# Patient Record
Sex: FEMALE | Race: OTHER | HISPANIC OR LATINO | URBAN - METROPOLITAN AREA
[De-identification: names, ages, dates, MRNs, and addresses within clinical notes are randomized per-mention and may not be internally consistent; named-entity substitution may affect disease eponyms.]

---

## 2022-11-04 ENCOUNTER — LAB OUTSIDE AN ENCOUNTER (OUTPATIENT)
Dept: URBAN - METROPOLITAN AREA CLINIC 88 | Facility: CLINIC | Age: 72
End: 2022-11-04

## 2022-11-04 ENCOUNTER — OFFICE VISIT (OUTPATIENT)
Dept: URBAN - METROPOLITAN AREA CLINIC 88 | Facility: CLINIC | Age: 72
End: 2022-11-04
Payer: MEDICARE

## 2022-11-04 VITALS
WEIGHT: 143.8 LBS | BODY MASS INDEX: 26.46 KG/M2 | HEART RATE: 96 BPM | TEMPERATURE: 97.5 F | SYSTOLIC BLOOD PRESSURE: 139 MMHG | HEIGHT: 62 IN | DIASTOLIC BLOOD PRESSURE: 83 MMHG

## 2022-11-04 DIAGNOSIS — R07.89 OTHER CHEST PAIN: ICD-10-CM

## 2022-11-04 DIAGNOSIS — K21.00 GASTROESOPHAGEAL REFLUX DISEASE WITH ESOPHAGITIS WITHOUT HEMORRHAGE: ICD-10-CM

## 2022-11-04 DIAGNOSIS — K44.9 HIATAL HERNIA: ICD-10-CM

## 2022-11-04 PROCEDURE — 99204 OFFICE O/P NEW MOD 45 MIN: CPT | Performed by: NURSE PRACTITIONER

## 2022-11-04 RX ORDER — ESTRADIOL 10 UG/1
INSERT VAGINAL
Qty: 0 | Refills: 0 | Status: DISCONTINUED | COMMUNITY
Start: 1900-01-01

## 2022-11-04 RX ORDER — ALPRAZOLAM 0.25 MG/1
1 TABLET TABLET ORAL AS NEEDED
Refills: 0 | Status: ACTIVE | COMMUNITY
Start: 1900-01-01

## 2022-11-04 RX ORDER — OMEPRAZOLE 20 MG/1
TAKE 1 CAPSULE (20 MG) BY ORAL ROUTE ONCE DAILY 30 MINUTES TO 1 HOUR BEFORE A MEAL FOR 60 DAYS CAPSULE, DELAYED RELEASE ORAL 1
Qty: 60 | Refills: 0 | Status: DISCONTINUED | COMMUNITY
Start: 2017-10-20

## 2022-11-04 RX ORDER — PANTOPRAZOLE SODIUM 20 MG/1
TAKE 1 TABLET TABLET, DELAYED RELEASE ORAL
Qty: 30 | Refills: 1 | OUTPATIENT
Start: 2022-11-04

## 2022-11-04 RX ORDER — ACETYLCARNITINE 500 MG
CAPSULE ORAL
Qty: 0 | Refills: 0 | Status: DISCONTINUED | COMMUNITY
Start: 1900-01-01

## 2022-11-04 RX ORDER — TURMERIC 400 MG
CAPSULE ORAL
Qty: 0 | Refills: 0 | Status: ACTIVE | COMMUNITY
Start: 1900-01-01

## 2022-11-04 RX ORDER — DICLOFENAC SODIUM 50 MG/1
1 TABLET AS NEEDED TABLET, DELAYED RELEASE ORAL PRN
Status: ACTIVE | COMMUNITY

## 2022-11-04 NOTE — HPI-TODAY'S VISIT:
Presents today for evaluation of GERD.  Voices a long history of GERD.  Over the last few years, symptoms were intermittently occuring.  Over the last 3 months, voices burning regurgitation, fluttering to retrosternal regions and hoarseness to voice. Was evaluated by ENT a month ago due to c/o hoarseness and was informed of having GERD.   Was prescribed famotidine 40 mg nightly but states this lead to headaches so discontinued use. Prefers holistic approach for treatment as result has started:  [employer] Reflux Inhibitor, Super Papaya Enzymes, Daily Essential Enzymes, Women's once a day probiotic and [employer]'s Acid Relief  to mange her symptoms.    Last EGD 09/2019:  Reflux esophagitis, small hiatal hernia, gastritis (neg HP), normal duodenum.

## 2022-11-22 ENCOUNTER — LAB OUTSIDE AN ENCOUNTER (OUTPATIENT)
Dept: URBAN - METROPOLITAN AREA CLINIC 70 | Facility: CLINIC | Age: 72
End: 2022-11-22

## 2022-11-22 ENCOUNTER — TELEPHONE ENCOUNTER (OUTPATIENT)
Dept: URBAN - METROPOLITAN AREA CLINIC 70 | Facility: CLINIC | Age: 72
End: 2022-11-22

## 2022-11-22 ENCOUNTER — TELEPHONE ENCOUNTER (OUTPATIENT)
Dept: URBAN - METROPOLITAN AREA CLINIC 88 | Facility: CLINIC | Age: 72
End: 2022-11-22

## 2022-11-22 RX ORDER — ALPRAZOLAM 0.25 MG/1
1 TABLET TABLET ORAL AS NEEDED
Refills: 0 | Status: ACTIVE | COMMUNITY
Start: 1900-01-01

## 2022-11-22 RX ORDER — PANTOPRAZOLE SODIUM 20 MG/1
TAKE 1 TABLET TABLET, DELAYED RELEASE ORAL
Qty: 30 | Refills: 1 | Status: ACTIVE | COMMUNITY
Start: 2022-11-04

## 2022-11-22 RX ORDER — TURMERIC 400 MG
CAPSULE ORAL
Qty: 0 | Refills: 0 | Status: ACTIVE | COMMUNITY
Start: 1900-01-01

## 2022-11-22 RX ORDER — DICLOFENAC SODIUM 50 MG/1
1 TABLET AS NEEDED TABLET, DELAYED RELEASE ORAL PRN
Status: ACTIVE | COMMUNITY

## 2022-11-22 RX ORDER — PANTOPRAZOLE SODIUM 40 MG/1
TAKE 1 TABLET TABLET, DELAYED RELEASE ORAL
Qty: 90 | Refills: 1
Start: 2022-11-04

## 2022-11-30 ENCOUNTER — OFFICE VISIT (OUTPATIENT)
Dept: URBAN - METROPOLITAN AREA SURGERY CENTER 24 | Facility: SURGERY CENTER | Age: 72
End: 2022-11-30

## 2022-12-02 ENCOUNTER — CLAIMS CREATED FROM THE CLAIM WINDOW (OUTPATIENT)
Dept: URBAN - METROPOLITAN AREA CLINIC 4 | Facility: CLINIC | Age: 72
End: 2022-12-02
Payer: MEDICARE

## 2022-12-02 ENCOUNTER — CLAIMS CREATED FROM THE CLAIM WINDOW (OUTPATIENT)
Dept: URBAN - METROPOLITAN AREA SURGERY CENTER 24 | Facility: SURGERY CENTER | Age: 72
End: 2022-12-02

## 2022-12-02 ENCOUNTER — CLAIMS CREATED FROM THE CLAIM WINDOW (OUTPATIENT)
Dept: URBAN - METROPOLITAN AREA SURGERY CENTER 24 | Facility: SURGERY CENTER | Age: 72
End: 2022-12-02
Payer: MEDICARE

## 2022-12-02 DIAGNOSIS — K31.89 ACQUIRED DEFORMITY OF DUODENUM: ICD-10-CM

## 2022-12-02 DIAGNOSIS — K31.89 OTHER DISEASES OF STOMACH AND DUODENUM: ICD-10-CM

## 2022-12-02 DIAGNOSIS — K31.7 BENIGN GASTRIC POLYP: ICD-10-CM

## 2022-12-02 DIAGNOSIS — K21.9 ACID REFLUX: ICD-10-CM

## 2022-12-02 PROCEDURE — 43251 EGD REMOVE LESION SNARE: CPT | Performed by: INTERNAL MEDICINE

## 2022-12-02 PROCEDURE — 88305 TISSUE EXAM BY PATHOLOGIST: CPT | Performed by: PATHOLOGY

## 2022-12-02 PROCEDURE — 43239 EGD BIOPSY SINGLE/MULTIPLE: CPT | Performed by: INTERNAL MEDICINE

## 2022-12-02 PROCEDURE — G8907 PT DOC NO EVENTS ON DISCHARG: HCPCS | Performed by: INTERNAL MEDICINE

## 2022-12-02 RX ORDER — TURMERIC 400 MG
CAPSULE ORAL
Qty: 0 | Refills: 0 | Status: ACTIVE | COMMUNITY
Start: 1900-01-01

## 2022-12-02 RX ORDER — DICLOFENAC SODIUM 50 MG/1
1 TABLET AS NEEDED TABLET, DELAYED RELEASE ORAL PRN
Status: ACTIVE | COMMUNITY

## 2022-12-02 RX ORDER — ALPRAZOLAM 0.25 MG/1
1 TABLET TABLET ORAL AS NEEDED
Refills: 0 | Status: ACTIVE | COMMUNITY
Start: 1900-01-01

## 2022-12-02 RX ORDER — PANTOPRAZOLE SODIUM 40 MG/1
TAKE 1 TABLET TABLET, DELAYED RELEASE ORAL
Qty: 90 | Refills: 1 | Status: ACTIVE | COMMUNITY
Start: 2022-11-04

## 2022-12-12 ENCOUNTER — OFFICE VISIT (OUTPATIENT)
Dept: URBAN - METROPOLITAN AREA CLINIC 88 | Facility: CLINIC | Age: 72
End: 2022-12-12

## 2023-01-03 ENCOUNTER — LAB OUTSIDE AN ENCOUNTER (OUTPATIENT)
Dept: URBAN - METROPOLITAN AREA CLINIC 88 | Facility: CLINIC | Age: 73
End: 2023-01-03

## 2023-01-03 ENCOUNTER — OFFICE VISIT (OUTPATIENT)
Dept: URBAN - METROPOLITAN AREA CLINIC 88 | Facility: CLINIC | Age: 73
End: 2023-01-03
Payer: MEDICARE

## 2023-01-03 VITALS
TEMPERATURE: 97.8 F | DIASTOLIC BLOOD PRESSURE: 81 MMHG | SYSTOLIC BLOOD PRESSURE: 148 MMHG | HEART RATE: 82 BPM | HEIGHT: 62 IN | WEIGHT: 141.6 LBS | BODY MASS INDEX: 26.06 KG/M2

## 2023-01-03 DIAGNOSIS — K21.00 GASTROESOPHAGEAL REFLUX DISEASE WITH ESOPHAGITIS WITHOUT HEMORRHAGE: ICD-10-CM

## 2023-01-03 DIAGNOSIS — R10.11 RUQ ABDOMINAL PAIN: ICD-10-CM

## 2023-01-03 DIAGNOSIS — K44.9 HIATAL HERNIA: ICD-10-CM

## 2023-01-03 PROBLEM — 266433003: Status: ACTIVE | Noted: 2022-11-04

## 2023-01-03 PROCEDURE — 99214 OFFICE O/P EST MOD 30 MIN: CPT | Performed by: NURSE PRACTITIONER

## 2023-01-03 RX ORDER — PANTOPRAZOLE SODIUM 40 MG/1
TAKE 1 TABLET TABLET, DELAYED RELEASE ORAL
Qty: 90 | Refills: 1 | Status: ACTIVE | COMMUNITY
Start: 2022-11-04

## 2023-01-03 RX ORDER — ALPRAZOLAM 0.25 MG/1
1 TABLET TABLET ORAL AS NEEDED
Refills: 0 | Status: ACTIVE | COMMUNITY
Start: 1900-01-01

## 2023-01-03 RX ORDER — TURMERIC 400 MG
CAPSULE ORAL
Qty: 0 | Refills: 0 | Status: ACTIVE | COMMUNITY
Start: 1900-01-01

## 2023-01-03 RX ORDER — DICLOFENAC SODIUM 50 MG/1
1 TABLET AS NEEDED TABLET, DELAYED RELEASE ORAL PRN
Status: ACTIVE | COMMUNITY

## 2023-01-03 RX ORDER — PANTOPRAZOLE SODIUM 40 MG/1
TAKE 1 TABLET TABLET, DELAYED RELEASE ORAL
OUTPATIENT
Start: 2022-11-04

## 2023-01-03 NOTE — HPI-OTHER HISTORIES
--------------------------------------------------------------- Last office note 11/04/2022: Presents today for evaluation of GERD.  Voices a long history of GERD.  Over the last few years, symptoms were intermittently occuring.  Over the last 3 months, voices burning regurgitation, fluttering to retrosternal regions and hoarseness to voice. Was evaluated by ENT a month ago due to c/o hoarseness and was informed of having GERD.   Was prescribed famotidine 40 mg nightly but states this lead to headaches so discontinued use. Prefers holistic approach for treatment as result has started:  [employer] Reflux Inhibitor, Super Papaya Enzymes, Daily Essential Enzymes, Women's once a day probiotic and [employer]'s Acid Relief  to mange her symptoms.    Last EGD 09/2019:  Reflux esophagitis, small hiatal hernia, gastritis (neg HP), normal duodenum.

## 2023-01-03 NOTE — HPI-TODAY'S VISIT:
Presents today as procedure follow up after undergoing an EGD on 12/02/2022.  Findings:  Reflux esophagitis (neg Muñiz's), small hiatal hernia, 24 mm fundic gland polyp, multiple fundic gland polyps, gastritis (neg HP), normal duodenum.  Was instructed to start pantoprazole 40 mg daily after LOV.  Since starting medication has helped to resolve globus sensation and hoarseness to voice.  Has questions and concerns regarding long term use of PPI therapy and findings fundic gland polyps. Reports occasional RUQ pain that has been occuring intermittently for years.  One night ago, experienced onset of indigestion adn RUQ pain after eating [employer].  Last US was over 3 years ago.

## 2023-02-27 ENCOUNTER — TELEPHONE ENCOUNTER (OUTPATIENT)
Dept: URBAN - METROPOLITAN AREA CLINIC 88 | Facility: CLINIC | Age: 73
End: 2023-02-27

## 2023-02-27 RX ORDER — PANTOPRAZOLE SODIUM 40 MG/1
TAKE 1 TABLET TABLET, DELAYED RELEASE ORAL
OUTPATIENT
Start: 2022-11-04

## 2023-02-27 RX ORDER — PANTOPRAZOLE SODIUM 20 MG/1
TAKE 1 TABLET TABLET, DELAYED RELEASE ORAL
Qty: 180 | Refills: 0 | OUTPATIENT
Start: 2023-02-28

## 2023-03-31 ENCOUNTER — WEB ENCOUNTER (OUTPATIENT)
Dept: URBAN - METROPOLITAN AREA CLINIC 88 | Facility: CLINIC | Age: 73
End: 2023-03-31

## 2023-03-31 ENCOUNTER — OFFICE VISIT (OUTPATIENT)
Dept: URBAN - METROPOLITAN AREA CLINIC 88 | Facility: CLINIC | Age: 73
End: 2023-03-31
Payer: MEDICARE

## 2023-03-31 ENCOUNTER — LAB OUTSIDE AN ENCOUNTER (OUTPATIENT)
Dept: URBAN - METROPOLITAN AREA CLINIC 88 | Facility: CLINIC | Age: 73
End: 2023-03-31

## 2023-03-31 ENCOUNTER — DASHBOARD ENCOUNTERS (OUTPATIENT)
Age: 73
End: 2023-03-31

## 2023-03-31 VITALS
BODY MASS INDEX: 26.57 KG/M2 | WEIGHT: 144.4 LBS | HEIGHT: 62 IN | SYSTOLIC BLOOD PRESSURE: 130 MMHG | HEART RATE: 88 BPM | DIASTOLIC BLOOD PRESSURE: 85 MMHG | TEMPERATURE: 97.5 F

## 2023-03-31 DIAGNOSIS — R10.11 RUQ ABDOMINAL PAIN: ICD-10-CM

## 2023-03-31 DIAGNOSIS — K21.00 GASTROESOPHAGEAL REFLUX DISEASE WITH ESOPHAGITIS WITHOUT HEMORRHAGE: ICD-10-CM

## 2023-03-31 DIAGNOSIS — K82.8 GALLBLADDER SLUDGE: ICD-10-CM

## 2023-03-31 DIAGNOSIS — K44.9 HIATAL HERNIA: ICD-10-CM

## 2023-03-31 PROCEDURE — 99213 OFFICE O/P EST LOW 20 MIN: CPT | Performed by: NURSE PRACTITIONER

## 2023-03-31 RX ORDER — ALPRAZOLAM 0.25 MG/1
1 TABLET TABLET ORAL AS NEEDED
Refills: 0 | Status: ACTIVE | COMMUNITY
Start: 1900-01-01

## 2023-03-31 RX ORDER — PANTOPRAZOLE SODIUM 20 MG/1
TAKE 1 TABLET TABLET, DELAYED RELEASE ORAL
OUTPATIENT
Start: 2023-02-28

## 2023-03-31 RX ORDER — DICLOFENAC SODIUM 50 MG/1
1 TABLET AS NEEDED TABLET, DELAYED RELEASE ORAL PRN
Status: ACTIVE | COMMUNITY

## 2023-03-31 RX ORDER — TURMERIC 400 MG
CAPSULE ORAL
Qty: 0 | Refills: 0 | Status: ACTIVE | COMMUNITY
Start: 1900-01-01

## 2023-03-31 RX ORDER — PANTOPRAZOLE SODIUM 20 MG/1
TAKE 1 TABLET TABLET, DELAYED RELEASE ORAL
Qty: 180 | Refills: 0 | Status: ACTIVE | COMMUNITY
Start: 2023-02-28

## 2023-03-31 NOTE — PHYSICAL EXAM GASTROINTESTINAL
Abdomen , soft, RUQ, epigastric tenderness, nondistended , no guarding or rigidity , no masses palpable , normal bowel sounds , Liver and Spleen:  no hepatosplenomegaly

## 2023-03-31 NOTE — HPI-TODAY'S VISIT:
Presents today for follow up regarding reflux.  Instructed to start taking pantoprazole 20 mg BID at LOV.  However, states she thought it was the same dose as 40 mg and has been taking 20 mg once a day.  Over the last 14 days has started taking medication every other day.  Started experiencing intermittent coughing, hoarseness of voice and clearing her throat 2 days ago.  Epigastric and RUQ abdominal pain has also returned. Voices an improvement in globus sensation.  RUQ US on 03/01/2023:  Fatty liver with mild sludge in gallbladder.  EGD on 12/02/2022.  Findings:  Reflux esophagitis (neg Muñiz's), small hiatal hernia, 24 mm fundic gland polyp, multiple fundic gland polyps, gastritis (neg HP), normal duodenum.

## 2023-03-31 NOTE — HPI-OTHER HISTORIES
---------------------------------------------------------------------------------------------- Last office note 01/03/2023: Presents today as procedure follow up after undergoing an EGD on 12/02/2022.  Findings:  Reflux esophagitis (neg Muñiz's), small hiatal hernia, 24 mm fundic gland polyp, multiple fundic gland polyps, gastritis (neg HP), normal duodenum.  Was instructed to start pantoprazole 40 mg daily after LOV.  Since starting medication has helped to resolve globus sensation and hoarseness to voice.  Has questions and concerns regarding long term use of PPI therapy and findings fundic gland polyps. Reports occasional RUQ pain that has been occuring intermittently for years.  One night ago, experienced onset of indigestion adn RUQ pain after eating [employer].  Last US was over 3 years ago.

## 2023-04-04 ENCOUNTER — OFFICE VISIT (OUTPATIENT)
Dept: URBAN - METROPOLITAN AREA CLINIC 88 | Facility: CLINIC | Age: 73
End: 2023-04-04

## 2023-05-12 ENCOUNTER — OFFICE VISIT (OUTPATIENT)
Dept: URBAN - METROPOLITAN AREA CLINIC 88 | Facility: CLINIC | Age: 73
End: 2023-05-12

## 2023-05-18 ENCOUNTER — TELEPHONE ENCOUNTER (OUTPATIENT)
Dept: URBAN - METROPOLITAN AREA CLINIC 70 | Facility: CLINIC | Age: 73
End: 2023-05-18